# Patient Record
Sex: FEMALE | Race: BLACK OR AFRICAN AMERICAN | Employment: OTHER | URBAN - METROPOLITAN AREA
[De-identification: names, ages, dates, MRNs, and addresses within clinical notes are randomized per-mention and may not be internally consistent; named-entity substitution may affect disease eponyms.]

---

## 2019-04-29 ENCOUNTER — HOSPITAL ENCOUNTER (EMERGENCY)
Age: 69
Discharge: HOME | End: 2019-04-29
Attending: EMERGENCY MEDICINE
Payer: MEDICARE

## 2019-04-29 VITALS
SYSTOLIC BLOOD PRESSURE: 141 MMHG | BODY MASS INDEX: 29.12 KG/M2 | TEMPERATURE: 98.4 F | RESPIRATION RATE: 19 BRPM | HEIGHT: 57 IN | HEART RATE: 130 BPM | WEIGHT: 135 LBS | DIASTOLIC BLOOD PRESSURE: 86 MMHG | OXYGEN SATURATION: 99 %

## 2019-04-29 DIAGNOSIS — M43.6 TORTICOLLIS: Primary | ICD-10-CM

## 2019-04-29 PROCEDURE — 6370000000 HC RX 637 (ALT 250 FOR IP): Performed by: EMERGENCY MEDICINE

## 2019-04-29 PROCEDURE — 99283 EMERGENCY DEPT VISIT LOW MDM: CPT

## 2019-04-29 RX ORDER — IBUPROFEN 600 MG/1
600 TABLET ORAL EVERY 8 HOURS PRN
Qty: 30 TABLET | Refills: 0 | Status: SHIPPED | OUTPATIENT
Start: 2019-04-29 | End: 2019-05-04

## 2019-04-29 RX ORDER — CYCLOBENZAPRINE HCL 10 MG
10 TABLET ORAL ONCE
Status: COMPLETED | OUTPATIENT
Start: 2019-04-29 | End: 2019-04-29

## 2019-04-29 RX ORDER — IBUPROFEN 400 MG/1
600 TABLET ORAL ONCE
Status: COMPLETED | OUTPATIENT
Start: 2019-04-29 | End: 2019-04-29

## 2019-04-29 RX ORDER — CYCLOBENZAPRINE HCL 10 MG
5 TABLET ORAL 2 TIMES DAILY PRN
Qty: 30 TABLET | Refills: 0 | Status: SHIPPED | OUTPATIENT
Start: 2019-04-29 | End: 2019-05-09

## 2019-04-29 RX ADMIN — CYCLOBENZAPRINE HYDROCHLORIDE 10 MG: 10 TABLET, FILM COATED ORAL at 17:48

## 2019-04-29 RX ADMIN — IBUPROFEN 600 MG: 400 TABLET, FILM COATED ORAL at 17:48

## 2019-04-29 ASSESSMENT — PAIN SCALES - GENERAL
PAINLEVEL_OUTOF10: 6
PAINLEVEL_OUTOF10: 6

## 2019-04-29 ASSESSMENT — PAIN DESCRIPTION - DESCRIPTORS: DESCRIPTORS: ACHING;TENDER

## 2019-04-29 ASSESSMENT — PAIN DESCRIPTION - PAIN TYPE: TYPE: ACUTE PAIN

## 2019-04-29 ASSESSMENT — PAIN DESCRIPTION - FREQUENCY: FREQUENCY: CONTINUOUS

## 2019-04-29 ASSESSMENT — PAIN DESCRIPTION - LOCATION: LOCATION: NECK

## 2019-04-29 ASSESSMENT — PAIN DESCRIPTION - ORIENTATION: ORIENTATION: LEFT

## 2019-04-29 NOTE — ED PROVIDER NOTES
[facility] ED     Emergency Department     Faculty Attestation    I performed a history and physical examination of the patient and discussed management with the resident. I reviewed the residents note and agree with the documented findings and plan of care. Any areas of disagreement are noted on the chart. I was personally present for the key portions of any procedures. I have documented in the chart those procedures where I was not present during the key portions. I have reviewed the emergency nurses triage note. I agree with the chief complaint, past medical history, past surgical history, allergies, medications, social and family history as documented unless otherwise noted below. For Physician Assistant/ Nurse Practitioner cases/documentation I have personally evaluated this patient and have completed at least one if not all key elements of the E/M (history, physical exam, and MDM). Additional findings are as noted. Left neck pain, awoke this morning with pain, no other symptoms. No chest pain, dyspnea, trouble breathing or swallowing, no neuro complaints. Well appearing. Lungs clear. Heart normal, equal radial pulses. Neck no midline tenderness, focal left trapezius tenderness. Patient has 5/5 strength for for shoulder ABduction, elbow flexion and extension, wrist extension, and finger ABduction bilaterally. Intact sensation bilaterally in all upper extremity dermatomes.   Will treat for torticollis, plan to discharge        Critical Care     none    Godfrey Solis MD, Brett Alexis  Attending Emergency  Physician             Godfrey Solis MD  04/29/19 3483

## 2019-04-29 NOTE — ED PROVIDER NOTES
[address] [facility]  ED  Emergency Department Encounter  EmergencyMedicine Resident     Pt Florencia Fulton  MRN: [id number]  [place] 1950  Date of evaluation: 4/29/19  PCP:  Margarito Mays MD    CHIEF COMPLAINT       Chief Complaint   Patient presents with    Torticollis     pt states that she thinks that she slept badly last night, c/o stiffness to left side of her neck into the shoulder        HISTORY OF PRESENT ILLNESS  (Location/Symptom, Timing/Onset, Context/Setting, Quality, Duration, Modifying Factors, Severity.)      Murl Aschoff is a 77 y.o. female who presents with complaints of \"slept on her neck wrong\". Patient is well-appearing no acute distress but is slightly tachycardic. Patient states that she slept wrong on her neck and has had spasms to the lateral portion of the cervical paraspinal musculature. Patient has no midline cervical tenderness to palpation, no radicular symptoms and has no focal neurological deficits. Patient has significant past medical history for hypertension and chronic tachycardia. Patient denies any headache or change in vision, no nausea or vomiting, no fevers or chills, no chest pain/shortness of breath, no abdominal pain, and no  symptoms including no hematuria or blood in stool, as well as no dysuria. PAST MEDICAL / SURGICAL / SOCIAL / FAMILY HISTORY      has a past medical history of Hyperlipidemia and Hypertension. has no past surgical history on file.     Social History     Socioeconomic History    Marital status: Single     Spouse name: Not on file    Number of children: Not on file    Years of education: Not on file    Highest education level: Not on file   Occupational History    Not on file   Social Needs    Financial resource strain: Not on file    Food insecurity:     Worry: Not on file     Inability: Not on file    Transportation needs:     Medical: Not on file     Non-medical: Not on file   Tobacco Use    Smoking status: Never Smoker   Substance and Sexual Activity    Alcohol use: No    Drug use: No    Sexual activity: Not on file   Lifestyle    Physical activity:     Days per week: Not on file     Minutes per session: Not on file    Stress: Not on file   Relationships    Social connections:     Talks on phone: Not on file     Gets together: Not on file     Attends Anabaptist service: Not on file     Active member of club or organization: Not on file     Attends meetings of clubs or organizations: Not on file     Relationship status: Not on file    Intimate partner violence:     Fear of current or ex partner: Not on file     Emotionally abused: Not on file     Physically abused: Not on file     Forced sexual activity: Not on file   Other Topics Concern    Not on file   Social History Narrative    Not on file       History reviewed. No pertinent family history. Allergies:  Patient has no known allergies. Home Medications:  Prior to Admission medications    Medication Sig Start Date End Date Taking? Authorizing Provider   ibuprofen (ADVIL;MOTRIN) 600 MG tablet Take 1 tablet by mouth every 8 hours as needed for Pain 4/29/19 5/4/19 Yes Ronald Singh DO   cyclobenzaprine (FLEXERIL) 10 MG tablet Take 0.5 tablets by mouth 2 times daily as needed for Muscle spasms 4/29/19 5/9/19 Yes Ronald Singh DO   ammonium lactate (LAC-HYDRIN) 12 % lotion  10/5/16   Historical Provider, MD   Atorvastatin Calcium (LIPITOR PO) Take 20 mg by mouth daily     Historical Provider, MD   lisinopril (PRINIVIL;ZESTRIL) 10 MG tablet Take 10 mg by mouth daily    Historical Provider, MD   amLODIPine (NORVASC) 5 MG tablet Take 5 mg by mouth daily. Historical Provider, MD   benazepril (LOTENSIN) 10 MG tablet Take 10 mg by mouth daily. Historical Provider, MD   acetaminophen 650 MG TABS Take 650 mg by mouth every 6 hours as needed for Pain.  1/25/14   Kelsi Corbin, DO       REVIEW OF SYSTEMS    (2-9 systems for level 4, 10 or more for level 5)      Review of Systems   Constitutional: Negative for chills and fever. HENT: Negative for congestion, rhinorrhea and sore throat. Eyes: Negative for discharge and redness. Respiratory: Negative for cough, chest tightness and shortness of breath. Cardiovascular: Negative for chest pain. Gastrointestinal: Negative for abdominal pain, blood in stool, diarrhea, nausea and vomiting. Endocrine: Negative for polydipsia and polyuria. Genitourinary: Negative for dysuria and hematuria. Musculoskeletal: Positive for neck pain and neck stiffness. Skin: Negative for rash and wound. Allergic/Immunologic: Negative for immunocompromised state. Neurological: Negative for weakness, numbness and headaches. Hematological: Negative for adenopathy. Psychiatric/Behavioral: Negative for agitation and behavioral problems. PHYSICAL EXAM   (up to 7 for level 4, 8 or more for level 5)      INITIAL VITALS:   BP (!) 141/86   Pulse 130   Temp 98.4 °F (36.9 °C) (Oral)   Resp 19   Ht 4' 9\" (1.448 m)   Wt 135 lb (61.2 kg)   SpO2 99%   BMI 29.21 kg/m²     Physical Exam   Constitutional: She is oriented to person, place, and time. She appears well-developed and well-nourished. No distress. Well-appearing, nontoxic, no acute distress, afebrile, slightly tachycardic   HENT:   Head: Normocephalic and atraumatic. Posterior oropharynx normal with no fullness, no erythema, no uvular deviation, no tonsillar hypertrophy or exudate seen, no difficulty controlling secretions, no change in phonation, no asymmetry noted, TM's normal bilaterally     Eyes: Pupils are equal, round, and reactive to light. Conjunctivae and EOM are normal.   Neck: Normal range of motion. Neck supple. No JVD present. No tracheal deviation present.    No midline cervical tenderness palpation, no obvious step-offs or deformities, no radicular symptoms, tenderness palpation over the left trapezius area with muscle spasms, no meningeal signs   Cardiovascular: Regular rhythm, normal heart sounds and intact distal pulses. tachycardia   Pulmonary/Chest: Effort normal and breath sounds normal. No stridor. No respiratory distress. She has no wheezes. She has no rales. She exhibits no tenderness. Abdominal: Soft. Bowel sounds are normal. She exhibits no distension. There is no tenderness. There is no rebound and no guarding. Abdomen soft, nondistended, nontender, non-peritoneal, no rebound tenderness or guarding, normal bowel sounds     Musculoskeletal: Normal range of motion. She exhibits tenderness. She exhibits no edema or deformity. See neck   Neurological: She is alert and oriented to person, place, and time. No cranial nerve deficit. No focal neurological deficits noted, moving all extremities, answering all questions appropriately, no change in sensation to bilateral upper and lower extremities, normal 5/5 motor strength bilateral upper and lower extremities, no loss of bowel or bladder, no nystagmus or disconjugate gaze     Skin: Skin is warm and dry. Capillary refill takes less than 2 seconds. No rash noted. Psychiatric: She has a normal mood and affect. DIFFERENTIAL  DIAGNOSIS     PLAN (LABS / IMAGING / EKG):  No orders of the defined types were placed in this encounter. MEDICATIONS ORDERED:  Orders Placed This Encounter   Medications    cyclobenzaprine (FLEXERIL) tablet 10 mg    ibuprofen (ADVIL;MOTRIN) tablet 600 mg    ibuprofen (ADVIL;MOTRIN) 600 MG tablet     Sig: Take 1 tablet by mouth every 8 hours as needed for Pain     Dispense:  30 tablet     Refill:  0    cyclobenzaprine (FLEXERIL) 10 MG tablet     Sig: Take 0.5 tablets by mouth 2 times daily as needed for Muscle spasms     Dispense:  30 tablet     Refill:  0       DDX: Torticollis, cervical muscle strain/sprain    DIAGNOSTIC RESULTS / EMERGENCY DEPARTMENT COURSE / MDM     LABS:  No results found for this visit on 04/29/19.     IMPRESSION/EMERGENCY DEPARTMENT COURSE:    Plan is to administer Motrin and Flexeril and reevaluate. Likely torticollis, no neurological deficits. On reevaluation patient is symptomatically improved, requesting discharge home. We'll discharge home with prescriptions for Motrin and Flexeril and instructions follow-up with her primary care physician within the next 24-48 hours or return if symptoms worsen. Patient agreeable to plan any nausea and understanding of return precautions. Patient instructed to return to the Emergency Department if symptoms worsen or new onset of symptoms occurs. If patient did not have a Primary Care physician, they were given contact information to contact [facility] to setup as a new patient, for continued care and treatment. Discussed findings of laboratory workup and imaging, if applicable. All questions and concerns were answered, and patient offered no additional complaints or concerns. Return precautions were given to patient, patient acknowledged understanding of return precautions and was able to relay signs and symptoms back that would need them to return to the Emergency Department. All prescriptions if given were discussed. Pt is agreeable to plan and is stable for discharge home at this time. RADIOLOGY:  none    PROCEDURES:  None    CONSULTS:  None    CRITICAL CARE:  None    FINAL IMPRESSION      1.  Torticollis          DISPOSITION / PLAN     DISPOSITION Decision To Discharge 04/29/2019 06:30:51 PM      PATIENT REFERRED TO:  Rakel Nevarez MD  [address] [facility]  [phone]    Schedule an appointment as soon as possible for a visit       [facility]  [address] [facility] [address]  [phone]  Go to   If symptoms worsen      DISCHARGE MEDICATIONS:  Discharge Medication List as of 4/29/2019  6:32 PM      START taking these medications    Details   ibuprofen (ADVIL;MOTRIN) 600 MG tablet Take 1 tablet by mouth every 8 hours as needed for Pain, Disp-30 tablet, R-0Print      cyclobenzaprine (FLEXERIL) 10 MG tablet Take 0.5 tablets by mouth 2 times daily as needed for Muscle spasms, Disp-30 tablet, R-0Print             Francois Cyr DO  Emergency Medicine Resident    (Please note that portions of this note were completed with a voice recognition program.  Effortswere made to edit the dictations but occasionally words are mis-transcribed.)     Francois Cyr DO  04/30/19 0396

## 2019-04-29 NOTE — ED TRIAGE NOTES
Pt to ED c/o left sided neck pain and stiffness that began after waking up today. Pt states that she believes that she slept badly last night on her side causing the neck pain. Pt states that she has had a stiff neck in the past and that this feels the same. Pt denies any fall/injury to her neck.

## 2019-04-30 ASSESSMENT — ENCOUNTER SYMPTOMS
COUGH: 0
VOMITING: 0
CHEST TIGHTNESS: 0
NAUSEA: 0
SHORTNESS OF BREATH: 0
ABDOMINAL PAIN: 0
RHINORRHEA: 0
DIARRHEA: 0
EYE DISCHARGE: 0
EYE REDNESS: 0
BLOOD IN STOOL: 0
SORE THROAT: 0

## 2021-09-12 ENCOUNTER — APPOINTMENT (OUTPATIENT)
Dept: CT IMAGING | Age: 71
End: 2021-09-12
Payer: MEDICARE

## 2021-09-12 ENCOUNTER — HOSPITAL ENCOUNTER (EMERGENCY)
Age: 71
Discharge: HOME | End: 2021-09-12
Attending: STUDENT IN AN ORGANIZED HEALTH CARE EDUCATION/TRAINING PROGRAM
Payer: MEDICARE

## 2021-09-12 VITALS
OXYGEN SATURATION: 99 % | HEART RATE: 103 BPM | WEIGHT: 147.1 LBS | SYSTOLIC BLOOD PRESSURE: 140 MMHG | DIASTOLIC BLOOD PRESSURE: 71 MMHG | RESPIRATION RATE: 18 BRPM | BODY MASS INDEX: 30.88 KG/M2 | HEIGHT: 58 IN | TEMPERATURE: 99.7 F

## 2021-09-12 DIAGNOSIS — H40.9 GLAUCOMA OF RIGHT EYE, UNSPECIFIED GLAUCOMA TYPE: Primary | ICD-10-CM

## 2021-09-12 LAB
ABSOLUTE EOS #: 0.09 K/UL (ref 0–0.44)
ABSOLUTE IMMATURE GRANULOCYTE: 0.02 K/UL (ref 0–0.3)
ABSOLUTE LYMPH #: 2.61 K/UL (ref 1.1–3.7)
ABSOLUTE MONO #: 0.42 K/UL (ref 0.1–1.2)
ANION GAP SERPL CALCULATED.3IONS-SCNC: 13 MMOL/L (ref 9–17)
BASOPHILS # BLD: 1 % (ref 0–2)
BASOPHILS ABSOLUTE: 0.04 K/UL (ref 0–0.2)
BUN BLDV-MCNC: 10 MG/DL (ref 8–23)
BUN/CREAT BLD: 16 (ref 9–20)
C-REACTIVE PROTEIN: <3 MG/L (ref 0–5)
CALCIUM SERPL-MCNC: 10.4 MG/DL (ref 8.6–10.4)
CHLORIDE BLD-SCNC: 100 MMOL/L (ref 98–107)
CO2: 25 MMOL/L (ref 20–31)
CREAT SERPL-MCNC: 0.63 MG/DL (ref 0.5–0.9)
DIFFERENTIAL TYPE: NORMAL
EOSINOPHILS RELATIVE PERCENT: 1 % (ref 1–4)
GFR AFRICAN AMERICAN: >60 ML/MIN
GFR NON-AFRICAN AMERICAN: >60 ML/MIN
GFR SERPL CREATININE-BSD FRML MDRD: ABNORMAL ML/MIN/{1.73_M2}
GFR SERPL CREATININE-BSD FRML MDRD: ABNORMAL ML/MIN/{1.73_M2}
GLUCOSE BLD-MCNC: 98 MG/DL (ref 70–99)
HCT VFR BLD CALC: 39.9 % (ref 36.3–47.1)
HEMOGLOBIN: 12.8 G/DL (ref 11.9–15.1)
IMMATURE GRANULOCYTES: 0 %
LYMPHOCYTES # BLD: 35 % (ref 24–43)
MCH RBC QN AUTO: 29.9 PG (ref 25.2–33.5)
MCHC RBC AUTO-ENTMCNC: 32.1 G/DL (ref 28.4–34.8)
MCV RBC AUTO: 93.2 FL (ref 82.6–102.9)
MONOCYTES # BLD: 6 % (ref 3–12)
NRBC AUTOMATED: 0 PER 100 WBC
PDW BLD-RTO: 14.3 % (ref 11.8–14.4)
PLATELET # BLD: 221 K/UL (ref 138–453)
PLATELET ESTIMATE: NORMAL
PMV BLD AUTO: 11.9 FL (ref 8.1–13.5)
POTASSIUM SERPL-SCNC: 3.5 MMOL/L (ref 3.7–5.3)
RBC # BLD: 4.28 M/UL (ref 3.95–5.11)
RBC # BLD: NORMAL 10*6/UL
SEDIMENTATION RATE, ERYTHROCYTE: 56 MM (ref 0–30)
SEG NEUTROPHILS: 57 % (ref 36–65)
SEGMENTED NEUTROPHILS ABSOLUTE COUNT: 4.36 K/UL (ref 1.5–8.1)
SODIUM BLD-SCNC: 138 MMOL/L (ref 135–144)
WBC # BLD: 7.5 K/UL (ref 3.5–11.3)
WBC # BLD: NORMAL 10*3/UL

## 2021-09-12 PROCEDURE — 99284 EMERGENCY DEPT VISIT MOD MDM: CPT

## 2021-09-12 PROCEDURE — 80048 BASIC METABOLIC PNL TOTAL CA: CPT

## 2021-09-12 PROCEDURE — 96376 TX/PRO/DX INJ SAME DRUG ADON: CPT

## 2021-09-12 PROCEDURE — 6360000004 HC RX CONTRAST MEDICATION: Performed by: STUDENT IN AN ORGANIZED HEALTH CARE EDUCATION/TRAINING PROGRAM

## 2021-09-12 PROCEDURE — 96374 THER/PROPH/DIAG INJ IV PUSH: CPT

## 2021-09-12 PROCEDURE — 85652 RBC SED RATE AUTOMATED: CPT

## 2021-09-12 PROCEDURE — 2580000003 HC RX 258: Performed by: STUDENT IN AN ORGANIZED HEALTH CARE EDUCATION/TRAINING PROGRAM

## 2021-09-12 PROCEDURE — 85025 COMPLETE CBC W/AUTO DIFF WBC: CPT

## 2021-09-12 PROCEDURE — 70481 CT ORBIT/EAR/FOSSA W/DYE: CPT

## 2021-09-12 PROCEDURE — 86140 C-REACTIVE PROTEIN: CPT

## 2021-09-12 PROCEDURE — 6370000000 HC RX 637 (ALT 250 FOR IP): Performed by: STUDENT IN AN ORGANIZED HEALTH CARE EDUCATION/TRAINING PROGRAM

## 2021-09-12 PROCEDURE — 6360000002 HC RX W HCPCS: Performed by: STUDENT IN AN ORGANIZED HEALTH CARE EDUCATION/TRAINING PROGRAM

## 2021-09-12 RX ORDER — TIMOLOL MALEATE 5 MG/ML
1 SOLUTION/ DROPS OPHTHALMIC ONCE
Status: COMPLETED | OUTPATIENT
Start: 2021-09-12 | End: 2021-09-12

## 2021-09-12 RX ORDER — SODIUM CHLORIDE 0.9 % (FLUSH) 0.9 %
10 SYRINGE (ML) INJECTION PRN
Status: DISCONTINUED | OUTPATIENT
Start: 2021-09-12 | End: 2021-09-12 | Stop reason: HOSPADM

## 2021-09-12 RX ORDER — ACETAZOLAMIDE 500 MG/5ML
500 INJECTION, POWDER, LYOPHILIZED, FOR SOLUTION INTRAVENOUS ONCE
Status: COMPLETED | OUTPATIENT
Start: 2021-09-12 | End: 2021-09-12

## 2021-09-12 RX ORDER — 0.9 % SODIUM CHLORIDE 0.9 %
80 INTRAVENOUS SOLUTION INTRAVENOUS ONCE
Status: COMPLETED | OUTPATIENT
Start: 2021-09-12 | End: 2021-09-12

## 2021-09-12 RX ORDER — GLIMEPIRIDE 2 MG/1
1 TABLET ORAL 2 TIMES DAILY
Qty: 5 ML | Refills: 0 | Status: SHIPPED | OUTPATIENT
Start: 2021-09-12

## 2021-09-12 RX ORDER — TETRACAINE HYDROCHLORIDE 5 MG/ML
1 SOLUTION OPHTHALMIC ONCE
Status: COMPLETED | OUTPATIENT
Start: 2021-09-12 | End: 2021-09-12

## 2021-09-12 RX ADMIN — FLUORESCEIN SODIUM 1 EACH: 0.6 STRIP OPHTHALMIC at 16:15

## 2021-09-12 RX ADMIN — TIMOLOL MALEATE 1 DROP: 5 SOLUTION OPHTHALMIC at 17:57

## 2021-09-12 RX ADMIN — TIMOLOL MALEATE 1 DROP: 5 SOLUTION OPHTHALMIC at 16:51

## 2021-09-12 RX ADMIN — SODIUM CHLORIDE, PRESERVATIVE FREE 10 ML: 5 INJECTION INTRAVENOUS at 17:45

## 2021-09-12 RX ADMIN — TETRACAINE HYDROCHLORIDE 1 DROP: 5 SOLUTION OPHTHALMIC at 17:31

## 2021-09-12 RX ADMIN — ACETAZOLAMIDE 500 MG: 500 INJECTION, POWDER, LYOPHILIZED, FOR SOLUTION INTRAVENOUS at 19:43

## 2021-09-12 RX ADMIN — SODIUM CHLORIDE 80 ML: 9 INJECTION, SOLUTION INTRAVENOUS at 17:45

## 2021-09-12 RX ADMIN — ACETAZOLAMIDE 500 MG: 500 INJECTION, POWDER, LYOPHILIZED, FOR SOLUTION INTRAVENOUS at 16:54

## 2021-09-12 RX ADMIN — IOPAMIDOL 75 ML: 755 INJECTION, SOLUTION INTRAVENOUS at 17:42

## 2021-09-12 ASSESSMENT — ENCOUNTER SYMPTOMS
EYE DISCHARGE: 0
COUGH: 0
SHORTNESS OF BREATH: 0
EYE PAIN: 1
ABDOMINAL PAIN: 0
EYE REDNESS: 1
VOMITING: 0
PHOTOPHOBIA: 0
EYE ITCHING: 0
NAUSEA: 0

## 2021-09-12 ASSESSMENT — PAIN SCALES - GENERAL: PAINLEVEL_OUTOF10: 2

## 2021-09-12 NOTE — ED NOTES
Visual acuity complete. L eye 20/40 (4/5 correct). R eye 20/60 (3/4 correct). Both eyes 20/40 (5/5 correct). Dr Davis Human notified.       Patti Petersen RN  09/12/21 0228

## 2021-09-12 NOTE — ED PROVIDER NOTES
[facility] ED  EMERGENCY DEPARTMENT ENCOUNTER      Pt Name: Damari Justice  MRN: [id number]  [place] 1950  Date of evaluation: 9/12/2021  Provider: Lissette Woodward, [facility]       Chief Complaint   Patient presents with    Eye Pain     Recently diagnosed with pink eye 2 weeks ago at [facility]; seen at [facility] today and told to come to ED         HISTORY OF PRESENT ILLNESS   (Location/Symptom, Timing/Onset, Context/Setting, Quality, Duration, Modifying Factors, Severity)  Note limiting factors. Damari Justice is a 65 y.o. female who presents to the emergency department with right eye pain. Patient states that 2 weeks ago she was seen at [facility] and diagnosed with pinkeye or conjunctivitis and took her eyedrops as prescribed however her symptoms have worsened and now she can barely see out of that eye and the eye has become very cloudy and painful. Denies any fevers, chills, sweats. Denies any headache currently but states that she does get them occasionally. HPI    Nursing Notes were reviewed. REVIEW OF SYSTEMS    (2-9 systems for level 4, 10 or more for level 5)     Review of Systems   Constitutional: Negative for chills and fever. Eyes: Positive for pain, redness and visual disturbance. Negative for photophobia, discharge and itching. Respiratory: Negative for cough and shortness of breath. Cardiovascular: Negative for chest pain. Gastrointestinal: Negative for abdominal pain, nausea and vomiting. Neurological: Positive for headaches. Negative for dizziness, syncope, weakness, light-headedness and numbness. Except as noted above the remainder of the review of systems was reviewed and negative. PAST MEDICAL HISTORY     Past Medical History:   Diagnosis Date    Hyperlipidemia     Hypertension          SURGICAL HISTORY     History reviewed. No pertinent surgical history.       CURRENT MEDICATIONS       Discharge Medication List as of 9/12/2021  7:15 PM      CONTINUE these medications which have NOT CHANGED    Details   ibuprofen (ADVIL;MOTRIN) 600 MG tablet Take 1 tablet by mouth every 8 hours as needed for Pain, Disp-30 tablet, R-0Print      ammonium lactate (LAC-HYDRIN) 12 % lotion Historical Med      Atorvastatin Calcium (LIPITOR PO) Take 20 mg by mouth daily       lisinopril (PRINIVIL;ZESTRIL) 10 MG tablet Take 10 mg by mouth daily      amLODIPine (NORVASC) 5 MG tablet Take 5 mg by mouth daily. benazepril (LOTENSIN) 10 MG tablet Take 10 mg by mouth daily. acetaminophen 650 MG TABS Take 650 mg by mouth every 6 hours as needed for Pain., Disp-23 tablet, R-0             ALLERGIES     Patient has no known allergies. FAMILY HISTORY     History reviewed. No pertinent family history. SOCIAL HISTORY       Social History     Socioeconomic History    Marital status: Single     Spouse name: None    Number of children: None    Years of education: None    Highest education level: None   Occupational History    None   Tobacco Use    Smoking status: Never Smoker   Substance and Sexual Activity    Alcohol use: No    Drug use: No    Sexual activity: None   Other Topics Concern    None   Social History Narrative    None     Social Determinants of Health     Financial Resource Strain:     Difficulty of Paying Living Expenses:    Food Insecurity:     Worried About Running Out of Food in the Last Year:     Ran Out of Food in the Last Year:    Transportation Needs:     Lack of Transportation (Medical):      Lack of Transportation (Non-Medical):    Physical Activity:     Days of Exercise per Week:     Minutes of Exercise per Session:    Stress:     Feeling of Stress :    Social Connections:     Frequency of Communication with Friends and Family:     Frequency of Social Gatherings with Friends and Family:     Attends Taoist Services:     Active Member of Clubs or Organizations:     Attends Club or Organization Meetings:     Marital Status:    Intimate - Abnormal; Notable for the following components:       Result Value    Potassium 3.5 (*)     All other components within normal limits   SEDIMENTATION RATE - Abnormal; Notable for the following components:    Sed Rate 56 (*)     All other components within normal limits   CBC WITH AUTO DIFFERENTIAL   C-REACTIVE PROTEIN       All other labs were within normal range or not returned as of this dictation. EMERGENCY DEPARTMENT COURSE and DIFFERENTIAL DIAGNOSIS/MDM:   Vitals:    Vitals:    09/12/21 1514   BP: (!) 140/71   Pulse: 103   Resp: 18   Temp: 99.7 °F (37.6 °C)   TempSrc: Oral   SpO2: 99%   Weight: 147 lb 1.6 oz (66.7 kg)   Height: 4' 10\" (1.473 m)       Patient is a 20-year-old female presenting with 2 weeks of right eye redness and pain. She was initially treated with antibiotic eyedrops 2 weeks ago however her symptoms have worsened. On exam vitals are normal patient is in no acute distress and does not appear to be in too much pain however the right eye has severe conjunctival injection and hyperemia with cloudiness. Tonometer reading is high in the right eye at 56 and normal in the left eye at 15. Patient does have extensive family history of glaucoma but she has never had it before. Visual acuity is 20/60 in the affected eye and 20/40 in the nonaffected eye. CT scan of the orbits shows no evidence of orbital cellulitis. Patient's glaucoma was treated with timolol drops and acetazolamide. On reevaluation 30 minutes after treatment right eye pressure was still elevated at 51. She was treated with another dose of timolol as well as another dose of acetazolamide. I did speak with Dr. Yohan Israel from ophthalmology who recommends coming into his office first thing in the morning at 8 AM plan to discharge patient with the timolol drops. Patient agreed with this plan and states that she does feel better after treatment.     MDM      REASSESSMENT          CRITICAL CARE TIME       CONSULTS:  IP CONSULT TO OPHTHALMOLOGY    PROCEDURES:  Unless otherwise noted below, none     Procedures      FINAL IMPRESSION      1. Glaucoma of right eye, unspecified glaucoma type          DISPOSITION/PLAN   DISPOSITION Decision To Discharge 09/12/2021 07:12:45 PM      PATIENT REFERRED TO:  Tate Christian MD  [address] [address]    Go in 1 day  Please go to the office at 8 AM to be seen by Dr. Arsenio Sahni. DISCHARGE MEDICATIONS:  Discharge Medication List as of 9/12/2021  7:15 PM      START taking these medications    Details   timolol (TIMOPTIC) 0.25 % ophthalmic solution Place 1 drop into the right eye 2 times daily, Disp-5 mL, R-0Normal           Controlled Substances Monitoring:     No flowsheet data found.     (Please note that portions of this note were completed with a voice recognition program.  Efforts were made to edit the dictations but occasionally words are mis-transcribed.)    Joni Gonzalez DO (electronically signed)  Attending Emergency Physician            Isak Harmon DO  09/12/21 2942